# Patient Record
Sex: FEMALE | URBAN - METROPOLITAN AREA
[De-identification: names, ages, dates, MRNs, and addresses within clinical notes are randomized per-mention and may not be internally consistent; named-entity substitution may affect disease eponyms.]

---

## 2018-10-24 ENCOUNTER — TRANSFERRED RECORDS (OUTPATIENT)
Dept: HEALTH INFORMATION MANAGEMENT | Facility: CLINIC | Age: 78
End: 2018-10-24

## 2018-11-08 NOTE — TELEPHONE ENCOUNTER
FUTURE VISIT INFORMATION      FUTURE VISIT INFORMATION:    Date: 11/16/18    Time: 800am    Location: [facility]  REFERRAL INFORMATION:    Referring provider:  GILLIAN JONES    Referring providers clinic:  [facility]    Reason for visit/diagnosis Recurring Left upper lid BCCA    RECORDS REQUESTED FROM:       Clinic name Comments Records Status Imaging Status   [facility] Notes transferred to HIM IN HIM

## 2018-11-16 ENCOUNTER — PRE VISIT (OUTPATIENT)
Dept: OPHTHALMOLOGY | Facility: CLINIC | Age: 78
End: 2018-11-16

## 2018-11-16 ENCOUNTER — OFFICE VISIT (OUTPATIENT)
Dept: OPHTHALMOLOGY | Facility: CLINIC | Age: 78
End: 2018-11-16
Payer: COMMERCIAL

## 2018-11-16 DIAGNOSIS — H00.026 MEIBOMIANITIS OF LEFT EYE: ICD-10-CM

## 2018-11-16 DIAGNOSIS — H02.9 LESION OF LEFT EYELID: Primary | ICD-10-CM

## 2018-11-16 RX ORDER — ATORVASTATIN CALCIUM 10 MG/1
TABLET, FILM COATED ORAL
Refills: 3 | COMMUNITY
Start: 2018-10-31

## 2018-11-16 RX ORDER — AMITRIPTYLINE HYDROCHLORIDE 50 MG/1
TABLET ORAL
Refills: 0 | COMMUNITY
Start: 2018-10-31

## 2018-11-16 RX ORDER — NEOMYCIN SULFATE, POLYMYXIN B SULFATE, AND DEXAMETHASONE 3.5; 10000; 1 MG/G; [USP'U]/G; MG/G
0.5 OINTMENT OPHTHALMIC 2 TIMES DAILY
Qty: 1 TUBE | Refills: 0 | Status: SHIPPED | OUTPATIENT
Start: 2018-11-16

## 2018-11-16 RX ORDER — LISINOPRIL/HYDROCHLOROTHIAZIDE 10-12.5 MG
TABLET ORAL
Refills: 3 | COMMUNITY
Start: 2018-11-06

## 2018-11-16 RX ORDER — GLIPIZIDE 5 MG/1
TABLET ORAL
Refills: 1 | COMMUNITY
Start: 2018-10-09

## 2018-11-16 RX ORDER — SERTRALINE HYDROCHLORIDE 100 MG/1
TABLET, FILM COATED ORAL
Refills: 2 | COMMUNITY
Start: 2018-08-30

## 2018-11-16 ASSESSMENT — VISUAL ACUITY
OD_SC: 20/20
METHOD: SNELLEN - LINEAR
OS_SC: 20/20

## 2018-11-16 ASSESSMENT — EXTERNAL EXAM - LEFT EYE: OS_EXAM: NORMAL

## 2018-11-16 ASSESSMENT — CONF VISUAL FIELD
OS_NORMAL: 1
OD_NORMAL: 1
METHOD: COUNTING FINGERS

## 2018-11-16 ASSESSMENT — SLIT LAMP EXAM - LIDS: COMMENTS: 2+ DERMATOCHALASIS

## 2018-11-16 ASSESSMENT — TONOMETRY
OD_IOP_MMHG: 18
IOP_METHOD: ICARE
OS_IOP_MMHG: 19

## 2018-11-16 ASSESSMENT — EXTERNAL EXAM - RIGHT EYE: OD_EXAM: NORMAL

## 2018-11-16 NOTE — MR AVS SNAPSHOT
After Visit Summary   2018    Candice Zimmerman    MRN: [id number]           Patient Information     Date Of Birth          1940        Visit Information        Provider Department      2018 8:00 AM Lawson Nails MD [facility]        Today's Diagnoses     Lesion of left eyelid    -  1    Meibomianitis of left eye           Follow-ups after your visit        Who to contact     Please call your clinic at [phone] to:    Ask questions about your health    Make or cancel appointments    Discuss your medicines    Learn about your test results    Speak to your doctor            Additional Information About Your Visit        MyChart Information     [employer] is an electronic gateway that provides easy, online access to your medical records. With [employer], you can request a clinic appointment, read your test results, renew a prescription or communicate with your care team.     To sign up for [employer] visit the website at [email or web address]   You will be asked to enter the access code listed below, as well as some personal information. Please follow the directions to create your username and password.     Your access code is: [email or web address]  Expires: 2019  5:31 AM     Your access code will  in 90 days. If you need help or a new code, please contact your [facility] or call [phone] for assistance.        Care EveryWhere ID     This is your Care EveryWhere ID. This could be used by other organizations to access your [place] medical records  [id number]         Blood Pressure from Last 3 Encounters:   No data found for BP    Weight from Last 3 Encounters:   No data found for Wt              We Performed the Following     External Photos OU (both eyes)          Today's Medication Changes          These changes are accurate as of 18  8:21 AM.  If you have any questions, ask your nurse or doctor.               Start taking  these medicines.        Dose/Directions    neomycin-polymyxin-dexamethasone 3.5-04537-2.1 Oint ophthalmic ointment   Commonly known as:  MAXITROL   Used for:  Lesion of left eyelid, Meibomianitis of left eye   Started by:  Lawson Nails MD        Dose:  0.5 inch   Place 0.5 inches Into the left eye 2 times daily   Quantity:  1 Tube   Refills:  0            Where to get your medicines      These medications were sent to [facility] [address] [place] [address]  AT [facility] [address] [facility] [address]     Phone:  [phone]     neomycin-polymyxin-dexamethasone 3.5-30023-6.1 Oint ophthalmic ointment               Information about OPIOIDS     PRESCRIPTION OPIOIDS: WHAT YOU NEED TO KNOW   We gave you an opioid (narcotic) pain medicine. It is important to manage your pain, but opioids are not always the best choice. You should first try all the other options your care team gave you. Take this medicine for as short a time (and as few doses) as possible.    Some activities can increase your pain, such as bandage changes or therapy sessions. It may help to take your pain medicine 30 to 60 minutes before these activities. Reduce your stress by getting enough sleep, working on hobbies you enjoy and practicing relaxation or meditation. Talk to your care team about ways to manage your pain beyond prescription opioids.    These medicines have risks:    DO NOT drive when on new or higher doses of pain medicine. These medicines can affect your alertness and reaction times, and you could be arrested for driving under the influence (DUI). If you need to use opioids long-term, talk to your care team about driving.    DO NOT operate heavy machinery    DO NOT do any other dangerous activities while taking these medicines.    DO NOT drink any alcohol while taking these medicines.     If the opioid prescribed includes acetaminophen, DO NOT take with any other medicines that  contain acetaminophen. Read all labels carefully. Look for the word  acetaminophen  or  Tylenol.  Ask your pharmacist if you have questions or are unsure.    You can get addicted to pain medicines, especially if you have a history of addiction (chemical, alcohol or substance dependence). Talk to your care team about ways to reduce this risk.    All opioids tend to cause constipation. Drink plenty of water and eat foods that have a lot of fiber, such as fruits, vegetables, prune juice, apple juice and high-fiber cereal. Take a laxative (Miralax, milk of magnesia, Colace, Senna) if you don t move your bowels at least every other day. Other side effects include upset stomach, sleepiness, dizziness, throwing up, tolerance (needing more of the medicine to have the same effect), physical dependence and slowed breathing.    Store your pills in a secure place, locked if possible. We will not replace any lost or stolen medicine. If you don t finish your medicine, please throw away (dispose) as directed by your pharmacist. The [place] [employer]ution [employer] has more information about safe disposal: [email or web address]-[email or web address]         Primary Care Provider    None Specified       No primary provider on file.        Equal Access to Services     JUAN M MIRANDA : Maru Fleming, kiki ballard, anibal norris, suly powell. So [facility] [phone].    ATENCIÓN: Si habla español, tiene a ag disposición servicios gratuitos de asistencia lingüística. Lita al [phone].    We comply with applicable federal civil rights laws and [place] laws. We do not discriminate on the basis of race, color, national origin, age, disability, sex, sexual orientation, or gender identity.            Thank you!     Thank you for choosing [facility]  for your care. Our goal is always to provide you with excellent care. Hearing back from our  patients is one way we can continue to improve our services. Please take a few minutes to complete the written survey that you may receive in the mail after your visit with us. Thank you!             Your Updated Medication List - Protect others around you: Learn how to safely use, store and throw away your medicines at www.disposemymeds.org.          This list is accurate as of 11/16/18  8:21 AM.  Always use your most recent med list.                   Brand Name Dispense Instructions for use Diagnosis    amitriptyline 50 MG tablet    ELAVIL     TK 1 T PO HS        atorvastatin 10 MG tablet    LIPITOR     TK 1 T PO HS        glipiZIDE 5 MG tablet    GLUCOTROL     TK 1 T PO BID B MEALS        lisinopril-hydrochlorothiazide 10-12.5 MG per tablet    PRINZIDE/ZESTORETIC     TK 1/2 T PO QD        neomycin-polymyxin-dexamethasone 3.5-62855-8.1 Oint ophthalmic ointment    MAXITROL    1 Tube    Place 0.5 inches Into the left eye 2 times daily    Lesion of left eyelid, Meibomianitis of left eye       sertraline 100 MG tablet    ZOLOFT     TK SS T PO HS        TRAMADOL HCL PO

## 2018-11-16 NOTE — NURSING NOTE
Chief Complaints and History of Present Illnesses   Patient presents with     Consult For     WILLIAM BCCA     HPI    Affected eye(s):  Left   Location:  Upper   Symptoms:     No blurred vision      Frequency:  Constant       Do you have eye pain now?:  No      Comments:  Spot on upper left lid. It was there in the past and was removed but is back again. It is not painful but will leak occasionally. No vision concerns.    Patricia Castrejon COT 7:40 AM November 16, 2018

## 2018-11-16 NOTE — LETTER
2018         RE:  :  MRN: Candice Zimmerman  1940  [id number]     Dear Dr. Mayela Perez,    Thank you for asking me to see your patient, Candice Zimmerman, for an oculoplastic   consultation.  My assessment and plan are below.  For further details, please see my attached clinic note.      Assessment    Candice Zimmerman is a 78 year old female with the following diagnoses:   1. Lesion of left eyelid       Hx of WILLIAM Basal Cell Ca, s/p Mohs excision and reconstruction in   - now w/ recurrence of lesion w/ whitish drainage on WILLIAM x 3weeks also with Meibomian gland dysfunction   - no changes to vision  - she also has small cystic lesion on LLL near punctum    PLAN:  Warm soaks  Maxitrol oint twice a day   Return to clinic with Dr. Perez in month to see if improved or worsened. If worsened, I would biopsy.         Again, thank you for allowing me to participate in the care of your patient.      Sincerely,    Lawson Nails MD  Department of Ophthalmology and Visual Neurosciences  [facility]    CC: MD [facility]  [address] [place] [id number]  VIA Facsimile: [phone]